# Patient Record
Sex: FEMALE | Race: WHITE | NOT HISPANIC OR LATINO | URBAN - METROPOLITAN AREA
[De-identification: names, ages, dates, MRNs, and addresses within clinical notes are randomized per-mention and may not be internally consistent; named-entity substitution may affect disease eponyms.]

---

## 2018-04-06 PROBLEM — 428283002 HISTORY OF POLYP OF COLON: Status: ACTIVE | Noted: 2018-04-06

## 2018-04-06 PROBLEM — 302914006 BARRETT'S ESOPHAGUS: Status: ACTIVE | Noted: 2018-04-06

## 2018-04-06 PROBLEM — 197480006 ANXIETY DISORDER: Status: ACTIVE | Noted: 2018-04-06

## 2018-04-06 PROBLEM — 398050005 DIVERTICULAR DISEASE OF COLON: Status: ACTIVE | Noted: 2018-04-06

## 2019-11-06 PROBLEM — 14304000 THYROID DISORDER: Status: ACTIVE | Noted: 2019-11-06

## 2021-08-28 ENCOUNTER — TELEPHONE ENCOUNTER (OUTPATIENT)
Dept: URBAN - METROPOLITAN AREA CLINIC 13 | Facility: CLINIC | Age: 66
End: 2021-08-28

## 2021-08-28 RX ORDER — BIOTIN 10 MG
TABLET ORAL
OUTPATIENT
End: 2019-09-06

## 2021-08-28 RX ORDER — MULTIVIT-MIN/FOLIC/VIT K/LYCOP 400-300MCG
TABLET ORAL
OUTPATIENT
End: 2019-09-06

## 2021-08-29 ENCOUNTER — TELEPHONE ENCOUNTER (OUTPATIENT)
Dept: URBAN - METROPOLITAN AREA CLINIC 13 | Facility: CLINIC | Age: 66
End: 2021-08-29

## 2021-08-29 RX ORDER — UMECLIDINIUM BROMIDE AND VILANTEROL TRIFENATATE 62.5; 25 UG/1; UG/1
POWDER RESPIRATORY (INHALATION)
Status: ACTIVE | COMMUNITY

## 2021-08-29 RX ORDER — LEVOTHYROXINE SODIUM 100 UG/1
TABLET ORAL
Status: ACTIVE | COMMUNITY

## 2021-08-29 RX ORDER — FLUOXETINE 10 MG/1
CAPSULE ORAL
Status: ACTIVE | COMMUNITY

## 2022-10-04 ENCOUNTER — WEB ENCOUNTER (OUTPATIENT)
Dept: URBAN - METROPOLITAN AREA CLINIC 46 | Facility: CLINIC | Age: 67
End: 2022-10-04

## 2022-10-07 ENCOUNTER — LAB OUTSIDE AN ENCOUNTER (OUTPATIENT)
Dept: URBAN - METROPOLITAN AREA CLINIC 46 | Facility: CLINIC | Age: 67
End: 2022-10-07

## 2022-10-07 ENCOUNTER — DASHBOARD ENCOUNTERS (OUTPATIENT)
Age: 67
End: 2022-10-07

## 2022-10-07 ENCOUNTER — OFFICE VISIT (OUTPATIENT)
Dept: URBAN - METROPOLITAN AREA CLINIC 46 | Facility: CLINIC | Age: 67
End: 2022-10-07
Payer: COMMERCIAL

## 2022-10-07 VITALS
WEIGHT: 187.4 LBS | HEIGHT: 65 IN | SYSTOLIC BLOOD PRESSURE: 139 MMHG | BODY MASS INDEX: 31.22 KG/M2 | HEART RATE: 60 BPM | DIASTOLIC BLOOD PRESSURE: 76 MMHG | TEMPERATURE: 98.7 F

## 2022-10-07 DIAGNOSIS — K21.9 GASTROESOPHAGEAL REFLUX DISEASE WITHOUT ESOPHAGITIS: ICD-10-CM

## 2022-10-07 DIAGNOSIS — K44.9 HIATAL HERNIA: ICD-10-CM

## 2022-10-07 DIAGNOSIS — K22.70 BARRETT'S ESOPHAGUS WITHOUT DYSPLASIA: ICD-10-CM

## 2022-10-07 PROBLEM — 84089009: Status: ACTIVE | Noted: 2022-10-07

## 2022-10-07 PROBLEM — 302914006: Status: ACTIVE | Noted: 2022-10-07

## 2022-10-07 PROCEDURE — 99204 OFFICE O/P NEW MOD 45 MIN: CPT | Performed by: INTERNAL MEDICINE

## 2022-10-07 PROCEDURE — 99214 OFFICE O/P EST MOD 30 MIN: CPT | Performed by: INTERNAL MEDICINE

## 2022-10-07 RX ORDER — FLUOXETINE 10 MG/1
1 CAPSULE CAPSULE ORAL
Status: ACTIVE | COMMUNITY

## 2022-10-07 RX ORDER — LEVOTHYROXINE SODIUM 100 UG/1
TABLET ORAL
Status: ACTIVE | COMMUNITY

## 2022-10-07 RX ORDER — UMECLIDINIUM BROMIDE AND VILANTEROL TRIFENATATE 62.5; 25 UG/1; UG/1
POWDER RESPIRATORY (INHALATION)
Status: ACTIVE | COMMUNITY

## 2022-10-07 RX ORDER — OMEPRAZOLE 40 MG/1
1 CAPSULE 30 MINUTES BEFORE MORNING MEAL CAPSULE, DELAYED RELEASE ORAL ONCE A DAY
Qty: 30 | Refills: 6 | OUTPATIENT

## 2022-10-07 NOTE — PHYSICAL EXAM NECK/THYROID:
normal appearance , without tenderness upon palpation , no deformities , trachea midline , Thyroid normal size , no thyroid nodules , no masses , no JVD , thyroid nontender
documentation/consult w/ pt's family directly relating to pts condition/consultation with other physicians/direct patient care (not related to procedure)/interpretation of diagnostic studies/additional history taking

## 2022-10-07 NOTE — HPI-TODAY'S VISIT:
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 The patient was diagnosed with Juarez's in 2010. GERD has worsened recenty and she is having noctural symptoms for which take TUMS. She feels regurgitation inntermittently.  The patient has had multiple EGDs with last being in 2018 and showed Juarez's esophagus and a hiatal hernia. She takes Meloxicam about 3 times a week but denies other NSAIDs. Currently, she still smokes 1ppd cigarettes

## 2022-10-10 PROBLEM — 266435005: Status: ACTIVE | Noted: 2022-10-07

## 2022-10-26 ENCOUNTER — TELEPHONE ENCOUNTER (OUTPATIENT)
Dept: URBAN - METROPOLITAN AREA CLINIC 48 | Facility: CLINIC | Age: 67
End: 2022-10-26

## 2022-10-27 ENCOUNTER — TELEPHONE ENCOUNTER (OUTPATIENT)
Dept: URBAN - METROPOLITAN AREA CLINIC 48 | Facility: CLINIC | Age: 67
End: 2022-10-27

## 2022-10-27 RX ORDER — FLUOXETINE 10 MG/1
1 CAPSULE CAPSULE ORAL
Status: ACTIVE | COMMUNITY

## 2022-10-27 RX ORDER — OMEPRAZOLE 40 MG/1
1 CAPSULE 30 MINUTES BEFORE MORNING MEAL CAPSULE, DELAYED RELEASE ORAL ONCE A DAY
Qty: 30 | Refills: 6 | Status: ACTIVE | COMMUNITY

## 2022-10-27 RX ORDER — UMECLIDINIUM BROMIDE AND VILANTEROL TRIFENATATE 62.5; 25 UG/1; UG/1
POWDER RESPIRATORY (INHALATION)
Status: ACTIVE | COMMUNITY

## 2022-10-27 RX ORDER — OMEPRAZOLE 20 MG/1
2 TABLET 30 MINUTES BEFORE MORNING MEAL TABLET, ORALLY DISINTEGRATING, DELAYED RELEASE ORAL ONCE A DAY
Qty: 60 | Refills: 6 | OUTPATIENT
Start: 2022-10-27

## 2022-10-27 RX ORDER — LEVOTHYROXINE SODIUM 100 UG/1
TABLET ORAL
Status: ACTIVE | COMMUNITY

## 2022-11-21 ENCOUNTER — OFFICE VISIT (OUTPATIENT)
Dept: URBAN - METROPOLITAN AREA SURGERY CENTER 28 | Facility: SURGERY CENTER | Age: 67
End: 2022-11-21
Payer: COMMERCIAL

## 2022-11-21 ENCOUNTER — CLAIMS CREATED FROM THE CLAIM WINDOW (OUTPATIENT)
Dept: URBAN - METROPOLITAN AREA CLINIC 4 | Facility: CLINIC | Age: 67
End: 2022-11-21
Payer: COMMERCIAL

## 2022-11-21 DIAGNOSIS — K22.70 BARRETT ESOPHAGUS: ICD-10-CM

## 2022-11-21 DIAGNOSIS — K22.719 BARRETT'S ESOPHAGUS WITH DYSPLASIA, UNSPECIFIED: ICD-10-CM

## 2022-11-21 DIAGNOSIS — K22.719 BARRETT'S ESOPHAGUS WITH DYSPLASIA: ICD-10-CM

## 2022-11-21 PROCEDURE — 43239 EGD BIOPSY SINGLE/MULTIPLE: CPT | Performed by: INTERNAL MEDICINE

## 2022-11-21 PROCEDURE — 88305 TISSUE EXAM BY PATHOLOGIST: CPT | Performed by: PATHOLOGY

## 2022-11-21 PROCEDURE — G8907 PT DOC NO EVENTS ON DISCHARG: HCPCS | Performed by: INTERNAL MEDICINE

## 2022-11-21 RX ORDER — OMEPRAZOLE 20 MG/1
2 TABLET 30 MINUTES BEFORE MORNING MEAL TABLET, ORALLY DISINTEGRATING, DELAYED RELEASE ORAL ONCE A DAY
Qty: 60 | Refills: 6 | Status: ACTIVE | COMMUNITY
Start: 2022-10-27

## 2022-11-21 RX ORDER — FLUOXETINE 10 MG/1
1 CAPSULE CAPSULE ORAL
Status: ACTIVE | COMMUNITY

## 2022-11-21 RX ORDER — OMEPRAZOLE 40 MG/1
1 CAPSULE 30 MINUTES BEFORE MORNING MEAL CAPSULE, DELAYED RELEASE ORAL ONCE A DAY
Qty: 30 | Refills: 6 | Status: ACTIVE | COMMUNITY

## 2022-11-21 RX ORDER — LEVOTHYROXINE SODIUM 100 UG/1
TABLET ORAL
Status: ACTIVE | COMMUNITY

## 2022-11-21 RX ORDER — UMECLIDINIUM BROMIDE AND VILANTEROL TRIFENATATE 62.5; 25 UG/1; UG/1
POWDER RESPIRATORY (INHALATION)
Status: ACTIVE | COMMUNITY